# Patient Record
Sex: MALE | Race: WHITE | Employment: UNEMPLOYED | URBAN - METROPOLITAN AREA
[De-identification: names, ages, dates, MRNs, and addresses within clinical notes are randomized per-mention and may not be internally consistent; named-entity substitution may affect disease eponyms.]

---

## 2017-08-24 ENCOUNTER — OFFICE VISIT (OUTPATIENT)
Dept: GASTROENTEROLOGY | Age: 62
End: 2017-08-24
Payer: COMMERCIAL

## 2017-08-24 VITALS
TEMPERATURE: 97.1 F | HEIGHT: 72 IN | WEIGHT: 226.9 LBS | SYSTOLIC BLOOD PRESSURE: 125 MMHG | DIASTOLIC BLOOD PRESSURE: 84 MMHG | BODY MASS INDEX: 30.73 KG/M2 | HEART RATE: 94 BPM

## 2017-08-24 DIAGNOSIS — Z86.010 HISTORY OF COLONIC POLYPS: ICD-10-CM

## 2017-08-24 DIAGNOSIS — R10.32 LEFT LOWER QUADRANT PAIN: ICD-10-CM

## 2017-08-24 DIAGNOSIS — K59.00 CONSTIPATION, UNSPECIFIED CONSTIPATION TYPE: ICD-10-CM

## 2017-08-24 PROCEDURE — 99213 OFFICE O/P EST LOW 20 MIN: CPT | Performed by: INTERNAL MEDICINE

## 2017-08-24 RX ORDER — CYANOCOBALAMIN (VITAMIN B-12) 1000 MCG
5 TABLET, EXTENDED RELEASE ORAL
COMMUNITY

## 2017-08-24 RX ORDER — DULOXETIN HYDROCHLORIDE 60 MG/1
60 CAPSULE, DELAYED RELEASE ORAL DAILY
COMMUNITY

## 2017-08-24 ASSESSMENT — ENCOUNTER SYMPTOMS
CONSTIPATION: 0
NAUSEA: 0
TROUBLE SWALLOWING: 0
ANAL BLEEDING: 0
RHINORRHEA: 0
VOICE CHANGE: 1
FACIAL SWELLING: 0
ABDOMINAL DISTENTION: 0
RESPIRATORY NEGATIVE: 1
ABDOMINAL PAIN: 1
ALLERGIC/IMMUNOLOGIC NEGATIVE: 1
RECTAL PAIN: 0
COUGH: 0
BACK PAIN: 1
SHORTNESS OF BREATH: 0
WHEEZING: 0
SINUS PRESSURE: 0
BLOOD IN STOOL: 0
SORE THROAT: 0
VOMITING: 0
DIARRHEA: 0

## 2017-10-31 DIAGNOSIS — R10.32 LEFT LOWER QUADRANT PAIN: ICD-10-CM

## 2017-10-31 DIAGNOSIS — K59.00 CONSTIPATION, UNSPECIFIED CONSTIPATION TYPE: ICD-10-CM

## 2017-10-31 DIAGNOSIS — Z86.010 HISTORY OF COLONIC POLYPS: ICD-10-CM

## 2017-11-07 ENCOUNTER — OFFICE VISIT (OUTPATIENT)
Dept: GASTROENTEROLOGY | Age: 62
End: 2017-11-07
Payer: COMMERCIAL

## 2017-11-07 VITALS
HEIGHT: 72 IN | HEART RATE: 100 BPM | DIASTOLIC BLOOD PRESSURE: 63 MMHG | OXYGEN SATURATION: 99 % | SYSTOLIC BLOOD PRESSURE: 101 MMHG | WEIGHT: 212.7 LBS | TEMPERATURE: 98.8 F | BODY MASS INDEX: 28.81 KG/M2

## 2017-11-07 DIAGNOSIS — K57.30 DIVERTICULOSIS OF COLON: ICD-10-CM

## 2017-11-07 DIAGNOSIS — K59.00 CONSTIPATION, UNSPECIFIED CONSTIPATION TYPE: ICD-10-CM

## 2017-11-07 DIAGNOSIS — Z86.010 HISTORY OF COLONIC POLYPS: Primary | ICD-10-CM

## 2017-11-07 PROCEDURE — 99213 OFFICE O/P EST LOW 20 MIN: CPT | Performed by: INTERNAL MEDICINE

## 2017-11-07 ASSESSMENT — ENCOUNTER SYMPTOMS
SINUS PRESSURE: 0
GASTROINTESTINAL NEGATIVE: 1
ALLERGIC/IMMUNOLOGIC NEGATIVE: 1
DIARRHEA: 0
SINUS PAIN: 0
COUGH: 0
SHORTNESS OF BREATH: 0
RHINORRHEA: 0
TROUBLE SWALLOWING: 0
SORE THROAT: 0
ABDOMINAL DISTENTION: 0
FACIAL SWELLING: 0
BLOOD IN STOOL: 0
VOMITING: 0
CONSTIPATION: 0
ANAL BLEEDING: 0
WHEEZING: 0
RECTAL PAIN: 0
RESPIRATORY NEGATIVE: 1
NAUSEA: 0
BACK PAIN: 1
VOICE CHANGE: 1
ABDOMINAL PAIN: 0

## 2017-11-07 NOTE — PROGRESS NOTES
dysuria and hematuria. Musculoskeletal: Positive for back pain. Negative for arthralgias, neck pain and neck stiffness. Skin: Negative. Allergic/Immunologic: Negative. Negative for environmental allergies, food allergies and immunocompromised state. Neurological: Positive for dizziness and light-headedness. Negative for tremors, seizures, syncope (5-6 times in the last 2 years having CT brain), facial asymmetry, speech difficulty, weakness, numbness and headaches. Hematological: Negative for adenopathy. Bruises/bleeds easily. Psychiatric/Behavioral: Negative. Negative for sleep disturbance. The patient is not nervous/anxious. Objective:   Physical Exam   Constitutional: He is oriented to person, place, and time. He appears well-developed and well-nourished. No distress. HENT:   Head: Normocephalic and atraumatic. Mouth/Throat: No oropharyngeal exudate. Eyes: Conjunctivae are normal. Pupils are equal, round, and reactive to light. No scleral icterus. Neck: Normal range of motion. Neck supple. No tracheal deviation present. No thyromegaly present. Cardiovascular: Normal rate, regular rhythm, normal heart sounds and intact distal pulses. No murmur heard. Pulmonary/Chest: Effort normal and breath sounds normal. No respiratory distress. He has no wheezes. He has no rales. Abdominal: Soft. Bowel sounds are normal. He exhibits no distension, no ascites and no mass. There is no hepatomegaly. There is no tenderness. There is no guarding. No hernia. No peripheral signs of ch. Liver disease   Genitourinary: Rectum normal.   Musculoskeletal: He exhibits no edema. Lymphadenopathy:     He has no cervical adenopathy. Neurological: He is alert and oriented to person, place, and time. No cranial nerve deficit. Skin: Skin is warm and dry. No rash noted. No erythema. Psychiatric: Thought content normal.   Nursing note and vitals reviewed. Assessment:      1.  History of colonic polyps     2. Diverticulosis of colon     3. Constipation, unspecified constipation type             Plan:      Patient is advised to continue medical therapy of constipation. Advised to see PCP for follow-up. If he has any further issues to contact me. I will see him on as-needed basis if he has any GI issues.

## 2019-07-22 ENCOUNTER — OFFICE VISIT (OUTPATIENT)
Dept: ORTHOPEDIC SURGERY | Age: 64
End: 2019-07-22
Payer: COMMERCIAL

## 2019-07-22 VITALS — WEIGHT: 200 LBS | HEIGHT: 72 IN | BODY MASS INDEX: 27.09 KG/M2

## 2019-07-22 DIAGNOSIS — M25.561 CHRONIC PAIN OF RIGHT KNEE: Primary | ICD-10-CM

## 2019-07-22 DIAGNOSIS — G89.29 CHRONIC PAIN OF RIGHT KNEE: Primary | ICD-10-CM

## 2019-07-22 PROCEDURE — 20610 DRAIN/INJ JOINT/BURSA W/O US: CPT | Performed by: ORTHOPAEDIC SURGERY

## 2019-07-22 PROCEDURE — 99203 OFFICE O/P NEW LOW 30 MIN: CPT | Performed by: ORTHOPAEDIC SURGERY

## 2019-07-22 RX ORDER — BUPIVACAINE HYDROCHLORIDE 5 MG/ML
2 INJECTION, SOLUTION PERINEURAL ONCE
Status: COMPLETED | OUTPATIENT
Start: 2019-07-22 | End: 2019-07-22

## 2019-07-22 RX ORDER — BETAMETHASONE SODIUM PHOSPHATE AND BETAMETHASONE ACETATE 3; 3 MG/ML; MG/ML
12 INJECTION, SUSPENSION INTRA-ARTICULAR; INTRALESIONAL; INTRAMUSCULAR; SOFT TISSUE ONCE
Status: COMPLETED | OUTPATIENT
Start: 2019-07-22 | End: 2019-07-22

## 2019-07-22 RX ORDER — LIDOCAINE HYDROCHLORIDE 10 MG/ML
2 INJECTION, SOLUTION EPIDURAL; INFILTRATION; INTRACAUDAL; PERINEURAL ONCE
Status: COMPLETED | OUTPATIENT
Start: 2019-07-22 | End: 2019-07-22

## 2019-07-22 RX ADMIN — BUPIVACAINE HYDROCHLORIDE 10 MG: 5 INJECTION, SOLUTION PERINEURAL at 15:55

## 2019-07-22 RX ADMIN — BETAMETHASONE SODIUM PHOSPHATE AND BETAMETHASONE ACETATE 12 MG: 3; 3 INJECTION, SUSPENSION INTRA-ARTICULAR; INTRALESIONAL; INTRAMUSCULAR; SOFT TISSUE at 15:54

## 2019-07-22 RX ADMIN — LIDOCAINE HYDROCHLORIDE 2 ML: 10 INJECTION, SOLUTION EPIDURAL; INFILTRATION; INTRACAUDAL; PERINEURAL at 15:55

## 2020-01-22 ENCOUNTER — OFFICE VISIT (OUTPATIENT)
Dept: ORTHOPEDIC SURGERY | Age: 65
End: 2020-01-22
Payer: COMMERCIAL

## 2020-01-22 VITALS — HEIGHT: 72 IN | WEIGHT: 198 LBS | BODY MASS INDEX: 26.82 KG/M2

## 2020-01-22 PROCEDURE — 99213 OFFICE O/P EST LOW 20 MIN: CPT | Performed by: ORTHOPAEDIC SURGERY

## 2020-01-22 PROCEDURE — 20610 DRAIN/INJ JOINT/BURSA W/O US: CPT | Performed by: ORTHOPAEDIC SURGERY

## 2020-01-22 RX ORDER — BETAMETHASONE SODIUM PHOSPHATE AND BETAMETHASONE ACETATE 3; 3 MG/ML; MG/ML
12 INJECTION, SUSPENSION INTRA-ARTICULAR; INTRALESIONAL; INTRAMUSCULAR; SOFT TISSUE ONCE
Status: COMPLETED | OUTPATIENT
Start: 2020-01-22 | End: 2020-01-22

## 2020-01-22 RX ORDER — LIDOCAINE HYDROCHLORIDE 10 MG/ML
2 INJECTION, SOLUTION EPIDURAL; INFILTRATION; INTRACAUDAL; PERINEURAL ONCE
Status: COMPLETED | OUTPATIENT
Start: 2020-01-22 | End: 2020-01-22

## 2020-01-22 RX ORDER — BUPIVACAINE HYDROCHLORIDE 5 MG/ML
2 INJECTION, SOLUTION PERINEURAL ONCE
Status: COMPLETED | OUTPATIENT
Start: 2020-01-22 | End: 2020-01-22

## 2020-01-22 RX ADMIN — LIDOCAINE HYDROCHLORIDE 2 ML: 10 INJECTION, SOLUTION EPIDURAL; INFILTRATION; INTRACAUDAL; PERINEURAL at 16:21

## 2020-01-22 RX ADMIN — BETAMETHASONE SODIUM PHOSPHATE AND BETAMETHASONE ACETATE 12 MG: 3; 3 INJECTION, SUSPENSION INTRA-ARTICULAR; INTRALESIONAL; INTRAMUSCULAR; SOFT TISSUE at 16:20

## 2020-01-22 RX ADMIN — BUPIVACAINE HYDROCHLORIDE 10 MG: 5 INJECTION, SOLUTION PERINEURAL at 16:21

## 2020-01-22 NOTE — PROGRESS NOTES
Brody Estrada M.D.            [address] [facility] [address] [facility],[address], [address] [facility]           Dept Phone: [phone]           Dept Fax:  [address]           Lynne Rajan          Dept Phone: [phone]           Dept Fax:  [phone]      Chief Compliant:  Chief Complaint   Patient presents with    Knee Pain     Right          History of Present Illness:  Rosy Bergman returns today. Refer to previous clinic notes for details. This is a 59 y.o. male who presents to the clinic today for right knee pain. He is status post left total knee in 2014. We injected his right knee last on 7/22/2019. He is status post bariatric 3 years ago and is now down 190 pounds. He states that his left knee is doing great. He also notes that the right knee injections were very helpful and would like to have another injection. Review of Systems   Constitutional: Negative for fever, chills, sweats, recent illness, or recent injury. Neurological: Negative for headaches, numbness, or weakness. Integumentary: Negative for rash, itching, ecchymosis, abrasions, or laceration. Musculoskeletal: Positive for Knee Pain (Right )       Physical Exam:  Constitutional: Patient is oriented to person, place, and time. Patient appears well-developed and well nourished. HENT: Negative otherwise noted  Head: Normocephalic and Atraumatic  Nose: Normal  Eyes: Conjunctivae and EOM are normal  Neck: Normal range of motion Neck supple. Respiratory/Cardio: Effort normal. No respiratory distress. Musculoskeletal:  Right full extension back to 120 degrees. Good stability. Neurological: Patient is alert and oriented to person, place, and time. Normal strenght. No sensory deficit.   Skin: Skin is warm and dry  Psychiatric: Behavior is normal. Thought content normal.  Nursing note and vitals daily., Disp: , Rfl:     exenatide (BYETTA 10 MCG PEN) 10 MCG/0.04ML injection, Inject 10 mcg into the skin 2 times daily (with meals). , Disp: , Rfl:     insulin glargine (LANTUS) 100 UNIT/ML injection, Inject 24 Units into the skin nightly., Disp: , Rfl:     levothyroxine (SYNTHROID) 100 MCG tablet, Take 100 mcg by mouth Daily. , Disp: , Rfl:     rOPINIRole (REQUIP) 0.5 MG tablet, Take 0.5 mg by mouth 3 times daily. , Disp: , Rfl:   Allergies   Allergen Reactions    Augmentin [Amoxicillin-Pot Clavulanate] Diarrhea     Social History     Socioeconomic History    Marital status: Single     Spouse name: Not on file    Number of children: Not on file    Years of education: Not on file    Highest education level: Not on file   Occupational History    Not on file   Social Needs    Financial resource strain: Not on file    Food insecurity:     Worry: Not on file     Inability: Not on file    Transportation needs:     Medical: Not on file     Non-medical: Not on file   Tobacco Use    Smoking status: Former Smoker    Smokeless tobacco: Never Used   Substance and Sexual Activity    Alcohol use: No    Drug use: No    Sexual activity: Not on file   Lifestyle    Physical activity:     Days per week: Not on file     Minutes per session: Not on file    Stress: Not on file   Relationships    Social connections:     Talks on phone: Not on file     Gets together: Not on file     Attends Taoism service: Not on file     Active member of club or organization: Not on file     Attends meetings of clubs or organizations: Not on file     Relationship status: Not on file    Intimate partner violence:     Fear of current or ex partner: Not on file     Emotionally abused: Not on file     Physically abused: Not on file     Forced sexual activity: Not on file   Other Topics Concern    Not on file   Social History Narrative    Not on file     Past Medical History:   Diagnosis Date    Cancer [facility])     larynx   

## 2021-12-05 ENCOUNTER — HOSPITAL ENCOUNTER (EMERGENCY)
Age: 66
Discharge: HOME | End: 2021-12-06
Attending: EMERGENCY MEDICINE
Payer: COMMERCIAL

## 2021-12-05 VITALS
HEIGHT: 72 IN | HEART RATE: 71 BPM | BODY MASS INDEX: 27.5 KG/M2 | DIASTOLIC BLOOD PRESSURE: 81 MMHG | OXYGEN SATURATION: 94 % | WEIGHT: 203 LBS | TEMPERATURE: 98.4 F | RESPIRATION RATE: 16 BRPM | SYSTOLIC BLOOD PRESSURE: 133 MMHG

## 2021-12-05 DIAGNOSIS — M54.2 NECK PAIN: Primary | ICD-10-CM

## 2021-12-05 PROCEDURE — 99284 EMERGENCY DEPT VISIT MOD MDM: CPT

## 2021-12-05 PROCEDURE — 93005 ELECTROCARDIOGRAM TRACING: CPT | Performed by: STUDENT IN AN ORGANIZED HEALTH CARE EDUCATION/TRAINING PROGRAM

## 2021-12-05 RX ORDER — CYCLOBENZAPRINE HCL 5 MG
5 TABLET ORAL 2 TIMES DAILY PRN
Qty: 10 TABLET | Refills: 0 | Status: SHIPPED | OUTPATIENT
Start: 2021-12-05 | End: 2021-12-15

## 2021-12-05 RX ORDER — LIDOCAINE 50 MG/G
1 PATCH TOPICAL DAILY
Qty: 10 PATCH | Refills: 0 | Status: SHIPPED | OUTPATIENT
Start: 2021-12-05 | End: 2021-12-15

## 2021-12-05 RX ORDER — CYCLOBENZAPRINE HCL 10 MG
5 TABLET ORAL ONCE
Status: COMPLETED | OUTPATIENT
Start: 2021-12-06 | End: 2021-12-06

## 2021-12-05 ASSESSMENT — PAIN SCALES - WONG BAKER: WONGBAKER_NUMERICALRESPONSE: 10

## 2021-12-05 ASSESSMENT — PAIN DESCRIPTION - PAIN TYPE: TYPE: ACUTE PAIN

## 2021-12-05 ASSESSMENT — PAIN SCALES - GENERAL: PAINLEVEL_OUTOF10: 10

## 2021-12-06 PROCEDURE — 6370000000 HC RX 637 (ALT 250 FOR IP): Performed by: STUDENT IN AN ORGANIZED HEALTH CARE EDUCATION/TRAINING PROGRAM

## 2021-12-06 RX ADMIN — CYCLOBENZAPRINE 5 MG: 10 TABLET, FILM COATED ORAL at 00:00

## 2021-12-06 ASSESSMENT — ENCOUNTER SYMPTOMS
COUGH: 0
DIARRHEA: 0
SHORTNESS OF BREATH: 0
NAUSEA: 0
BACK PAIN: 0
VOMITING: 0
ABDOMINAL PAIN: 0
CONSTIPATION: 0

## 2021-12-06 NOTE — ED PROVIDER NOTES
[facility]     Emergency Department     Faculty Attestation    I performed a history and physical examination of the patient and discussed management with the resident. I have reviewed and agree with the residents findings including all diagnostic interpretations, and treatment plans as written. Any areas of disagreement are noted on the chart. I was personally present for the key portions of any procedures. I have documented in the chart those procedures where I was not present during the key portions. I have reviewed the emergency nurses triage note. I agree with the chief complaint, past medical history, past surgical history, allergies, medications, social and family history as documented unless otherwise noted below. Documentation of the HPI, Physical Exam and Medical Decision Making performed by scribgopal is based on my personal performance of the HPI, PE and MDM. For Physician Assistant/ Nurse Practitioner cases/documentation I have personally evaluated this patient and have completed at least one if not all key elements of the E/M (history, physical exam, and MDM). Additional findings are as noted. 78 yo M c/o chronic neck pain, no injury or fall, denies paresthesia, no fever or loss of function in extremities,   Pt states pain is chronic & request flexeril only,   pe vss gcs 15, diffuse posterior cervical tenderness, no crepitus, no deformity, hand grasp strong equal,     -no indication of injury or neuro deficit, I feel pt stable for  Out pt tx, & pt will follow with his pain management specialist,     EKG Interpretation    Interpreted by me      CRITICAL CARE: There was a high probability of clinically significant/life threatening deterioration in this patient's condition which required my urgent intervention. Total critical care time was 5 minutes. This excludes any time for separately reportable procedures.        Mancel Flax DO Anita Alegria DO  12/05/21 8278

## 2021-12-06 NOTE — ED NOTES
Bed: 19  Expected date:   Expected time:   Means of arrival:   Comments:  [address] [facility] Felicia RN  12/05/21 2833

## 2021-12-06 NOTE — ED PROVIDER NOTES
[facility] ED  Emergency Department Encounter  Emergency Medicine Resident     Pt Name: Cyndee Mendoza  YSK:[id number]  [place] 1955  Date of evaluation: 12/5/21  PCP:  Swati Barrett MD    [address] [place] [address]       Chief Complaint   Patient presents with    Neck Pain     since disagreement with a Surgeon on Thursday, denies injury     HISTORY OF PRESENT ILLNESS  (Location/Symptom, Timing/Onset, Context/Setting, Quality, Duration, ModifyingFactors, Severity.)      Cyndee Mendoza is a 77 y.o. male with PMH of larynx cancer, htn, who presents for evaluation of neck pain. Has history of laryngeal cancer s/p radiation therapy. Now has calcifications on his neck causing chronic neck pain for years. Patient follows with pain management and takes Percocet at home. Has appointment Wednesday for nerve stimulator implantation. States this is his usual pain but was bothering him a lot today so he called EMS. No numbness, weakness, tingling, chest pain, shortness of breath. PAST MEDICAL / SURGICAL / SOCIAL /FAMILY HISTORY      has a past medical history of Cancer ([facility] [address] Diverticulosis of colon, DVT (deep venous thrombosis) ([facility] [address] Hypertension, IDDM (insulin dependent diabetes mellitus), Neuropathy, and Tubular adenoma of colon. No other pertinent PMH on review with patient/guardian. has a past surgical history that includes Colonoscopy (3/7/2011); Colonoscopy (10/27/2008); Upper gastrointestinal endoscopy (12/18/2007); Upper gastrointestinal endoscopy (7/16/2007); joint replacement (Left); Spine surgery; Cardiac catheterization; Colonoscopy (12/03/2013); Upper gastrointestinal endoscopy (12/03/2013); and Colonoscopy (10/09/2017). No other pertinent PSH on review with patient/guardian.   Social History     Socioeconomic History    Marital status: Single     Spouse name: Not on file    Number of children: Not on file    Years of education: Not on file    Highest education level: Not on file   Occupational History    Not on file   Tobacco Use    Smoking status: Former Smoker    Smokeless tobacco: Never Used   Substance and Sexual Activity    Alcohol use: No    Drug use: No    Sexual activity: Not on file   Other Topics Concern    Not on file   Social History Narrative    Not on file     Social Determinants of Health     Financial Resource Strain:     Difficulty of Paying Living Expenses: Not on file   Food Insecurity:     Worried About Running Out of Food in the Last Year: Not on file    Chiquis of Food in the Last Year: Not on file   Transportation Needs:     Lack of Transportation (Medical): Not on file    Lack of Transportation (Non-Medical): Not on file   Physical Activity:     Days of Exercise per Week: Not on file    Minutes of Exercise per Session: Not on file   Stress:     Feeling of Stress : Not on file   Social Connections:     Frequency of Communication with Friends and Family: Not on file    Frequency of Social Gatherings with Friends and Family: Not on file    Attends Gnosticism Services: Not on file    Active Member of [address] [place] [address] or Organizations: Not on file    Attends Club or Organization Meetings: Not on file    Marital Status: Not on file   Intimate Partner Violence:     Fear of Current or Ex-Partner: Not on file    Emotionally Abused: Not on file    Physically Abused: Not on file    Sexually Abused: Not on file   Housing Stability:     Unable to Pay for Housing in the Last Year: Not on file    Number of [place] in the Last Year: Not on file    Unstable Housing in the Last Year: Not on file       I counseled the patient against using tobacco products. Family History   Problem Relation Age of Onset    Heart Disease Mother     COPD Father     Cancer Paternal Uncle         throat     No other pertinent FamHx on review with patient/guardian.     Allergies:  Augmentin [amoxicillin-pot clavulanate]    Home Medications:  Prior to Admission medications Medication Sig Start Date End Date Taking? Authorizing Provider   cyclobenzaprine (FLEXERIL) 5 MG tablet Take 1 tablet by mouth 2 times daily as needed for Muscle spasms 12/5/21 12/15/21 Yes Lary Leonardo DO   lidocaine (LIDODERM) 5 % Place 1 patch onto the skin daily for 10 days 12 hours on, 12 hours off. 12/5/21 12/15/21 Yes Lary Leonardo DO   DULoxetine (CYMBALTA) 60 MG extended release capsule Take 60 mg by mouth daily    Historical Provider, MD   Cyanocobalamin 1000 MCG SUBL Place 1,000 mcg under the tongue daily    Historical Provider, MD   calcium-vitamin D (OSCAL) 250-125 MG-UNIT per tablet Take 1 tablet by mouth 3 times daily    Historical Provider, MD   vitamin D (CHOLECALCIFEROL) 70629 UNIT CAPS Take 50,000 Units by mouth every 30 days    Historical Provider, MD   calcium citrate-vitamin D (CITRICAL + D) 315-250 MG-UNIT TABS per tablet Take 5 tablets by mouth daily (with breakfast)    Historical Provider, MD   Alendronate Sodium (FOSAMAX PO) Take by mouth    Historical Provider, MD   Canagliflozin (INVOKANA PO) Take by mouth    Historical Provider, MD   atorvastatin (LIPITOR) 10 MG tablet Take 10 mg by mouth daily    Historical Provider, MD   oxyCODONE-acetaminophen (PERCOCET) 5-325 MG per tablet  9/6/16   Historical Provider, MD Darrick Antunez 18 MCG inhalation capsule  7/7/16   Historical Provider, MD   tiotropium (SPIRIVA HANDIHALER) 18 MCG inhalation capsule Inhale 18 mcg into the lungs daily    Historical Provider, MD   Budesonide-Formoterol Fumarate (SYMBICORT IN) Inhale into the lungs    Historical Provider, MD   XARELTO 20 MG TABS tablet  8/2/16   Historical Provider, MD   furosemide (LASIX) 20 MG tablet Take 20 mg by mouth 2 times daily. Historical Provider, MD   metFORMIN (GLUCOPHAGE) 500 MG tablet Take 1,000 mg by mouth 2 times daily (with meals). Historical Provider, MD   GLYBURIDE PO Take 8 mg by mouth daily.     Historical Provider, MD   exenatide (BYETTA 10 MCG PEN) 10 MCG/0.04ML injection Inject 10 mcg into the skin 2 times daily (with meals). Historical Provider, MD   insulin glargine (LANTUS) 100 UNIT/ML injection Inject 24 Units into the skin nightly. Historical Provider, MD   levothyroxine (SYNTHROID) 100 MCG tablet Take 100 mcg by mouth Daily. Historical Provider, MD   rOPINIRole (REQUIP) 0.5 MG tablet Take 0.5 mg by mouth 3 times daily. Historical Provider, MD       REVIEW OF SYSTEMS    (2-9 systems for level 4, 10 ormore for level 5)      Review of Systems   Constitutional: Negative for fever. Eyes: Negative for visual disturbance. Respiratory: Negative for cough and shortness of breath. Cardiovascular: Negative for chest pain. Gastrointestinal: Negative for abdominal pain, constipation, diarrhea, nausea and vomiting. Musculoskeletal: Positive for neck pain. Negative for back pain. Skin: Negative for rash. Allergic/Immunologic: Negative for immunocompromised state. Neurological: Negative for headaches. Hematological: Does not bruise/bleed easily. PHYSICAL EXAM   (up to 7 for level 4, 8 or more for level 5)      INITIAL VITALS:   /81   Pulse 71   Temp 98.4 °F (36.9 °C) (Oral)   Resp 16   Ht 6' (1.829 m)   Wt 203 lb (92.1 kg)   SpO2 94%   BMI 27.53 kg/m²     Physical Exam  Constitutional:       General: He is not in acute distress. Appearance: Normal appearance. He is not ill-appearing, toxic-appearing or diaphoretic. HENT:      Head: Normocephalic and atraumatic. Right Ear: External ear normal.      Left Ear: External ear normal.   Eyes:      General:         Right eye: No discharge. Left eye: No discharge. Neck:      Comments: Diffuse cervical tenderness. No step-offs or crepitus. Cardiovascular:      Rate and Rhythm: Normal rate and regular rhythm. Pulses: Normal pulses. Heart sounds: No murmur heard. Pulmonary:      Effort: Pulmonary effort is normal. No respiratory distress. Breath sounds: Normal breath sounds. No wheezing, rhonchi or rales. Musculoskeletal:      Cervical back: Normal range of motion and neck supple. Skin:     Capillary Refill: Capillary refill takes less than 2 seconds. Neurological:      General: No focal deficit present. Mental Status: He is alert. Comments: A&O x3.  Cranial nerves II-XII intact. 5/5 strength in BUE/BLE. Sensation intact. Finger-nose-finger intact. No pronator drift. Reflexes 2+ in upper extremities       DIFFERENTIAL  DIAGNOSIS     PLAN (LABS / IMAGING / EKG):  No orders of the defined types were placed in this encounter. MEDICATIONS ORDERED:  Orders Placed This Encounter   Medications    cyclobenzaprine (FLEXERIL) 5 MG tablet     Sig: Take 1 tablet by mouth 2 times daily as needed for Muscle spasms     Dispense:  10 tablet     Refill:  0    lidocaine (LIDODERM) 5 %     Sig: Place 1 patch onto the skin daily for 10 days 12 hours on, 12 hours off. Dispense:  10 patch     Refill:  0    cyclobenzaprine (FLEXERIL) tablet 5 mg       DIAGNOSTIC RESULTS / EMERGENCY DEPARTMENT COURSE / MDM     LABS:  No results found for this visit on 12/05/21. IMPRESSION/MDM/ED COURSE:  77 y.o. male presented with chronic neck pain unchanged from baseline. Patient afebrile vitals WNL. On exam patient resting comfortably in no acute distress. He does have diffuse tenderness of the cervical spine and paraspinal muscles. No neurologic deficits or hyperreflexia. Given the patient has not had any injury, has had multiple imaging in the past (although these are not available for my review), and known cause of pain that is unchanged from baseline I do not feel further imaging is necessary at this time. Will treat symptomatically with Flexeril and lidocaine. Follow-up with pain specialist Wednesday as scheduled for nerve stimulator implantation. I discussed signs and symptoms that would require reevaluation in the ED.   The patient expressed understanding and agreement with plan. All questions answered. RADIOLOGY:  No orders to display       EKG  None    All EKG's are interpreted by the Emergency Department Physician who either signs or Co-signs this chart in the absence of a cardiologist.    PROCEDURES:  None    CONSULTS:  None    FINAL IMPRESSION      1.  Neck pain        DISPOSITION / PLAN     DISPOSITION Decision To Discharge 12/05/2021 11:49:40 PM      PATIENT REFERREDTO:  MD MCKAYLA Leblanc [address]  [address] [facility] [address] [phone]      As needed      DISCHARGE MEDICATIONS:  Discharge Medication List as of 12/5/2021 11:52 PM      START taking these medications    Details   cyclobenzaprine (FLEXERIL) 5 MG tablet Take 1 tablet by mouth 2 times daily as needed for Muscle spasms, Disp-10 tablet, R-0Print      lidocaine (LIDODERM) 5 % Place 1 patch onto the skin daily for 10 days 12 hours on, 12 hours off., Disp-10 patch, R-0Print             Jona Petersen DO  PGY 2  Resident Physician Emergency Medicine  12/06/21 12:36 AM    (Please note that portions of this note were completed with a voice recognition program.Efforts were made to edit the dictations but occasionally words are mis-transcribed.)       Collin Mora DO  Resident  12/06/21 9045

## 2021-12-06 NOTE — ED NOTES
Pt moved to ED 19 by EMS via stretcher. Pt is a/o x4. Pt states that since last Thursday he has had neck pain. Pt states he was supposed to have surgery and they did not perform the surgery (to replace battery in TENS unit in his back). He had an argument with the surgeon and states the stress caused the neck pain. Pt states that he has taken 4 Percocet today, last dose being around 1600. Pt denies any other pain, SOB, CP. Pt placed on cardiac monitor, labs and EKG gathered. Call light provided. Will continue to monitor. No needs at this time, warm blankets provided.       Ab Russo RN  12/05/21 [id number]

## 2021-12-07 LAB
EKG ATRIAL RATE: 81 BPM
EKG P AXIS: -23 DEGREES
EKG P-R INTERVAL: 224 MS
EKG Q-T INTERVAL: 430 MS
EKG QRS DURATION: 182 MS
EKG QTC CALCULATION (BAZETT): 499 MS
EKG R AXIS: -42 DEGREES
EKG T AXIS: -8 DEGREES
EKG VENTRICULAR RATE: 81 BPM

## 2022-04-25 ENCOUNTER — OFFICE VISIT (OUTPATIENT)
Dept: ORTHOPEDIC SURGERY | Age: 67
End: 2022-04-25
Payer: COMMERCIAL

## 2022-04-25 VITALS — HEIGHT: 72 IN | WEIGHT: 203 LBS | BODY MASS INDEX: 27.5 KG/M2

## 2022-04-25 DIAGNOSIS — M25.511 RIGHT SHOULDER PAIN, UNSPECIFIED CHRONICITY: Primary | ICD-10-CM

## 2022-04-25 PROCEDURE — 99213 OFFICE O/P EST LOW 20 MIN: CPT | Performed by: ORTHOPAEDIC SURGERY

## 2022-04-25 RX ORDER — WARFARIN SODIUM 5 MG/1
5 TABLET ORAL DAILY
COMMUNITY
Start: 2018-06-04

## 2022-04-25 NOTE — PROGRESS NOTES
Orthopedic Shoulder Encounter Note     Chief complaint: right shoulder pain    HPI: Tom Ardon is a 77 y.o.  right-hand dominant male who presents for evaluation of his right shoulder. Indicates that he developed pain in the shoulder about 2 weeks ago. He denies any specific trauma or injury but does recall noticing it for the first time lifting a bucket of bait with that right arm and moving a decided on a counter. His pain is primarily localized to the superior and lateral aspect of the shoulder when present. He was prescribed what sounds like a compound topical by his PCP which she has been applying to the shoulder and also has been using some heat treatment. In the past couple of days his pain has fully resolved but this was after he had set up this appointment. He denies having any radicular symptoms, weakness, or stiffness. Previous treatment:    NSAIDs: None    Physical Therapy: Yes    Injections: None    Surgeries: None    Review of Systems:   Constitutional: Negative for fever, chills, sweats. Pain Score:   4  Neurological: Negative for headache, numbness, or weakness. Musculoskeletal: As noted in HPI     Past Medical History  Ran Espinal  has a [facility] Diverticulosis of colon, DVT (deep venous thrombosis) ([facility] [address] Hypertension, IDDM (insulin dependent diabetes mellitus), Neuropathy, and Tubular adenoma of colon. Past Surgical History  Ran Espinal  has a past surgical history that includes Colonoscopy (3/7/2011); Colonoscopy (10/27/2008); Upper gastrointestinal endoscopy (12/18/2007); Upper gastrointestinal endoscopy (7/16/2007); joint replacement (Left); Spine surgery; Cardiac catheterization; Colonoscopy (12/03/2013); Upper gastrointestinal endoscopy (12/03/2013); and Colonoscopy (10/09/2017).     Current Medications  Current Outpatient Medications   Medication Sig Dispense Refill    DULoxetine (CYMBALTA) 60 MG extended release capsule Take 60 mg by mouth daily      Cyanocobalamin 1000 MCG SUBL Place 1,000 mcg under the tongue daily      calcium-vitamin D (OSCAL) 250-125 MG-UNIT per tablet Take 1 tablet by mouth 3 times daily      vitamin D (CHOLECALCIFEROL) 41169 UNIT CAPS Take 50,000 Units by mouth every 30 days      calcium citrate-vitamin D (CITRICAL + D) 315-250 MG-UNIT TABS per tablet Take 5 tablets by mouth daily (with breakfast)      Alendronate Sodium (FOSAMAX PO) Take by mouth      Canagliflozin (INVOKANA PO) Take by mouth      atorvastatin (LIPITOR) 10 MG tablet Take 10 mg by mouth daily      oxyCODONE-acetaminophen (PERCOCET) 5-325 MG per tablet       SPIRIVA HANDIHALER 18 MCG inhalation capsule       tiotropium (SPIRIVA HANDIHALER) 18 MCG inhalation capsule Inhale 18 mcg into the lungs daily      Budesonide-Formoterol Fumarate (SYMBICORT IN) Inhale into the lungs      XARELTO 20 MG TABS tablet       furosemide (LASIX) 20 MG tablet Take 20 mg by mouth 2 times daily.  metFORMIN (GLUCOPHAGE) 500 MG tablet Take 1,000 mg by mouth 2 times daily (with meals).  GLYBURIDE PO Take 8 mg by mouth daily.  exenatide (BYETTA 10 MCG PEN) 10 MCG/0.04ML injection Inject 10 mcg into the skin 2 times daily (with meals).  insulin glargine (LANTUS) 100 UNIT/ML injection Inject 24 Units into the skin nightly.  levothyroxine (SYNTHROID) 100 MCG tablet Take 100 mcg by mouth Daily.  rOPINIRole (REQUIP) 0.5 MG tablet Take 0.5 mg by mouth 3 times daily. No current facility-administered medications for this visit. Allergies  Allergies have been reviewed. Sandie aWre is allergic to augmentin [amoxicillin-pot clavulanate]. Social History  Sandie Ware  reports that he has quit smoking. He has never used smokeless tobacco. He reports that he does not drink alcohol and does not use drugs. Family History  Cleve's family history includes COPD in his father; Cancer in his paternal uncle; Heart Disease in his mother.      Physical Exam:     Ht 6' (1.829 m) Wt 203 lb (92.1 kg)   BMI 27.53 kg/m²    Constitutional: Patient is oriented to person, place, and time. Patient appears well-developed and well nourished. Mental Status/psychiatric: Behavior is normal. Thought content normal.  HENT: Negative otherwise noted  Head: Normocephalic and Atraumatic  Nose: Normal  Respiratory/Cardio: Effort normal. No respiratory distress. Shoulder:    Skin: Skin is warm and dry; no swelling or obvious muscular atrophy. Vasculature: 2+ radial pulses bilaterally  Neuro: Sensation grossly intact to light touch diffusely  Tenderness: Tender to palpation over the right acromioclavicular joint    ROM: (Degrees)    Right   A P   Left   A P    Elevation  105 145   Elevation  35   Abduction  95 145   Abduction  45    ER   55 85   ER   80   IR   L4    IR   L1   90 abd/ER      90 abd/ER     90 abd/IR      90 abd/IR     Crepitation  No    Crepitation No  Dyskenesia  No    Dyskenesia No      Muscle strength:    Right       Left    Deltoid   5    Deltoid   5  Supraspinatus  2    Supraspinatus  2  ER   3    ER   3  IR   5    IR   5    Special tests    Right   Rotator Cuff    Left    n   Painful arc    n   n   Pain with ER    n    n   Neer's     n    n   Hawkin's    n    n   Drop Arm    n  n   Lift off/Belly Press   n  n   ER Lag    n          AC Joint  y   AC tenderness   n  y   Cross-chest adduction  n       Labrum/biceps    n   [place]'s    na   n   Biceps sheer    n      n   Speed's/Yergason's   na    n   Tenderness Biceps Groove  n    n   John's    n         Instability  n   Ant Apprehension   n    n   Post Apprehension   n    n   Ant Load shift    n    n   Post Load shift   n   n   Sulcus     n  n   Generalized Laxity   n  n   Relocation test   n  n   Crank test     n  n   Griffin-superior escape  n       Imaging:  Xrays: 2 views of the right shoulder obtained on 4/9/22 were independently reviewed  Indications: Right shoulder pain  Findings: Normal glenohumeral joint space.   Mild osteophytic change about the distal clavicle at the acromioclavicular joint associated with some subchondral cystic changes. Impression: Right shoulder radiographs with moderate degenerative changes at the acromioclavicular joint. Impression/Plan:     Jabier Main is a 77 y.o. old male with right shoulder pain that appears to be due to acromioclavicular joint arthritis. I had a discussion with the patient today educating him about this condition and discussed treatment options available to him including nonoperative and operative intervention. At this time he is doing quite well and not having any symptoms and so no treatment was initiated today. I will see him back in my clinic as needed but he may return or call at anytime with recurrent symptoms and does do anything from questions or concerns. This note is created with the assistance of a speech recognition program.  While intending to generate adocument that actually reflects the content of the visit, the document can still have some errors including those of syntax and sound a like substitutions which may escape proof reading. It such instances, actual meaningcan be extrapolated by contextual diversion.     NA = Not assessed  RTC = Rotator cuff  RCT = Rotator cuff tear  ER = External rotation  IR = Internal rotation  AC = Acromioclavicular  GH = Glenohumeral  n = No  y = Yes